# Patient Record
Sex: MALE | URBAN - METROPOLITAN AREA
[De-identification: names, ages, dates, MRNs, and addresses within clinical notes are randomized per-mention and may not be internally consistent; named-entity substitution may affect disease eponyms.]

---

## 2023-12-15 ENCOUNTER — APPOINTMENT (EMERGENCY)
Dept: RADIOLOGY | Facility: HOSPITAL | Age: 23
End: 2023-12-15

## 2023-12-15 ENCOUNTER — HOSPITAL ENCOUNTER (EMERGENCY)
Facility: HOSPITAL | Age: 23
Discharge: HOME | End: 2023-12-15
Attending: EMERGENCY MEDICINE

## 2023-12-15 VITALS
DIASTOLIC BLOOD PRESSURE: 67 MMHG | SYSTOLIC BLOOD PRESSURE: 97 MMHG | RESPIRATION RATE: 16 BRPM | TEMPERATURE: 98.7 F | HEART RATE: 56 BPM | WEIGHT: 145 LBS | OXYGEN SATURATION: 100 %

## 2023-12-15 DIAGNOSIS — S90.812A ABRASION OF LEFT HEEL, INITIAL ENCOUNTER: ICD-10-CM

## 2023-12-15 DIAGNOSIS — S93.409A ANKLE SPRAIN: Primary | ICD-10-CM

## 2023-12-15 PROCEDURE — 73610 X-RAY EXAM OF ANKLE: CPT

## 2023-12-15 PROCEDURE — 99284 EMERGENCY DEPT VISIT MOD MDM: CPT

## 2023-12-15 PROCEDURE — 99283 EMERGENCY DEPT VISIT LOW MDM: CPT

## 2023-12-15 RX ORDER — GINSENG 100 MG
1 CAPSULE ORAL ONCE
Status: COMPLETED | OUTPATIENT
Start: 2023-12-15 | End: 2023-12-15

## 2023-12-15 RX ORDER — IBUPROFEN 600 MG/1
600 TABLET ORAL ONCE
Status: COMPLETED | OUTPATIENT
Start: 2023-12-15 | End: 2023-12-15

## 2023-12-15 RX ORDER — ACETAMINOPHEN 325 MG/1
975 TABLET ORAL ONCE
Status: COMPLETED | OUTPATIENT
Start: 2023-12-15 | End: 2023-12-15

## 2023-12-15 RX ADMIN — IBUPROFEN 600 MG: 600 TABLET, FILM COATED ORAL at 12:44

## 2023-12-15 RX ADMIN — ACETAMINOPHEN 325MG 975 MG: 325 TABLET ORAL at 12:44

## 2023-12-15 RX ADMIN — BACITRACIN ZINC 1 SMALL APPLICATION: 500 OINTMENT TOPICAL at 13:42

## 2023-12-15 NOTE — Clinical Note
Anuradhaannalise Karl was seen and treated in our emergency department on 12/15/2023. Diagnosis:     Kenard Holter  may return to work on return date. He may return on this date: 12/16/2023         If you have any questions or concerns, please don't hesitate to call.       Marguerite Block PA-C    ______________________________           _______________          _______________  Hospital Representative                              Date                                Time
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
## 2023-12-15 NOTE — Clinical Note
Maurice Matthews was seen and treated in our emergency department on 12/15/2023. Diagnosis:     Tara Soto  may return to work on return date. He may return on this date: 12/16/2023         If you have any questions or concerns, please don't hesitate to call.       Osorio Peterson PA-C    ______________________________           _______________          _______________  Hospital Representative                              Date                                Time

## 2023-12-15 NOTE — DISCHARGE INSTRUCTIONS
Lave la herida diariamente con agua y [place]. Use tobilleras y muletas según sea necesario para el dolor. Realice un seguimiento con podología si los síntomas persisten. Wash wound daily with mild soap and water. Wear ankle brace and crutches as needed for pain.  Follow up with podiatry if symptoms persist.